# Patient Record
Sex: MALE | Race: WHITE | URBAN - METROPOLITAN AREA
[De-identification: names, ages, dates, MRNs, and addresses within clinical notes are randomized per-mention and may not be internally consistent; named-entity substitution may affect disease eponyms.]

---
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
## 2025-05-06 ENCOUNTER — OFFICE VISIT (OUTPATIENT)
Dept: URGENT CARE | Facility: CLINIC | Age: 20
End: 2025-05-06
Payer: COMMERCIAL

## 2025-05-06 VITALS
HEART RATE: 94 BPM | OXYGEN SATURATION: 100 % | RESPIRATION RATE: 18 BRPM | HEIGHT: 65 IN | WEIGHT: 160 LBS | DIASTOLIC BLOOD PRESSURE: 72 MMHG | TEMPERATURE: 99 F | BODY MASS INDEX: 26.66 KG/M2 | SYSTOLIC BLOOD PRESSURE: 121 MMHG

## 2025-05-06 DIAGNOSIS — L02.91 CUTANEOUS ABSCESS, UNSPECIFIED SITE: Primary | ICD-10-CM

## 2025-05-06 PROCEDURE — 99499 UNLISTED E&M SERVICE: CPT | Mod: ,,, | Performed by: CLINIC/CENTER

## 2025-05-06 PROCEDURE — 10060 I&D ABSCESS SIMPLE/SINGLE: CPT | Mod: ,,, | Performed by: CLINIC/CENTER

## 2025-05-06 RX ORDER — SULFAMETHOXAZOLE AND TRIMETHOPRIM 800; 160 MG/1; MG/1
1 TABLET ORAL 2 TIMES DAILY
Qty: 14 TABLET | Refills: 0 | Status: SHIPPED | OUTPATIENT
Start: 2025-05-06 | End: 2025-05-13

## 2025-05-06 NOTE — PROCEDURES
Incision & Drainage    Date/Time: 5/6/2025 12:30 PM    Performed by: Joseph Díaz FNP  Authorized by: Joseph Díaz FNP    Consent Done?:  Yes (Verbal) and Yes (Written)    Type:  Abscess  Body area:  Trunk (Left axilla)  Anesthesia:  Local infiltration  Local anesthetic: Lidocaine 1% without epinephrine  Scalpel size:  11  Incision type:  Single straight  Complexity:  Simple  Drainage:  Pus and bloody  Wound treatment:  Incision and wound packed  Packing material:  1/4 in iodoform gauze  Patient tolerance:  Patient tolerated the procedure well with no immediate complications

## 2025-05-06 NOTE — PATIENT INSTRUCTIONS
Wound Care: Twice daily wound care as discussed.   Pain: Take OTC Tylenol or Ibuprofen per package instructions as needed for pain.  Loosen the bandage if needed.   Follow up with this clinic Friday to get the wound re-evaluated.    Removed the packing in 48 hours  Present to the Emergency Department for any significant change or worsening symptoms including worsening redness, swelling, purulent discharge, fever, body aches, or chills.

## 2025-05-06 NOTE — PROGRESS NOTES
"Subjective:      Patient ID: Luis Agee is a 19 y.o. male.    Vitals:  height is 5' 4.96" (1.65 m) and weight is 72.6 kg (160 lb). His oral temperature is 98.9 °F (37.2 °C). His blood pressure is 121/72 and his pulse is 94. His respiration is 18 and oxygen saturation is 100%.     Chief Complaint: Recurrent Skin Infections     Patient is a 19 y.o. male who presents to urgent care with complaints of "boil" in left axilla, drainage, pain x 3 days. Alleviating factors include triple abx cream with mild amount of relief. Patient denies fever.        Skin:  Positive for lesion.      Objective:     Physical Exam   Constitutional: He is oriented to person, place, and time. He appears well-developed. He is cooperative.   HENT:   Head: Normocephalic and atraumatic.   Ears:   Right Ear: Hearing, tympanic membrane, external ear and ear canal normal.   Left Ear: Hearing, tympanic membrane, external ear and ear canal normal.   Nose: Nose normal. No mucosal edema or nasal deformity. No epistaxis. Right sinus exhibits no maxillary sinus tenderness and no frontal sinus tenderness. Left sinus exhibits no maxillary sinus tenderness and no frontal sinus tenderness.   Mouth/Throat: Uvula is midline, oropharynx is clear and moist and mucous membranes are normal. No trismus in the jaw. Normal dentition. No uvula swelling.   Eyes: Conjunctivae and lids are normal.   Neck: Trachea normal and phonation normal. Neck supple.   Cardiovascular: Normal rate, regular rhythm, normal heart sounds and normal pulses.   Pulmonary/Chest: Effort normal and breath sounds normal.   Abdominal: Normal appearance and bowel sounds are normal. Soft.   Musculoskeletal: Normal range of motion.         General: Normal range of motion.   Neurological: He is alert and oriented to person, place, and time. He exhibits normal muscle tone.   Skin: Skin is warm, dry and intact.         Comments: There is a 3 cm x 2 cm abscess noted on the patient's left axilla.  " "This wound is already draining.  There is obvious fluctuance and purulent drainage.  There is another smaller abscess that is proximally 1 cm x 1 cm just proximal to the larger abscess.  This 1 is fluctuant as well.  There is no surrounding cellulitis   Psychiatric: His speech is normal and behavior is normal. Judgment and thought content normal.   Nursing note and vitals reviewed.         Previous History      Review of patient's allergies indicates:  No Known Allergies    Past Medical History:   Diagnosis Date    Known health problems: none      Current Outpatient Medications   Medication Instructions    sulfamethoxazole-trimethoprim 800-160mg (BACTRIM DS) 800-160 mg Tab 1 tablet, Oral, 2 times daily     Past Surgical History:   Procedure Laterality Date    NO PAST SURGERIES       No family history on file.    Social History[1]     Physical Exam      Vital Signs Reviewed   /72   Pulse 94   Temp 98.9 °F (37.2 °C) (Oral)   Resp 18   Ht 5' 4.96" (1.65 m)   Wt 72.6 kg (160 lb)   SpO2 100%   BMI 26.66 kg/m²        Procedures    Procedures  Incision & Drainage     Date/Time: 5/6/2025 12:30 PM     Performed by: Joseph Díaz FNP  Authorized by: Joseph Díaz FNP    Consent Done?:  Yes (Verbal) and Yes (Written)     Type:  Abscess  Body area:  Trunk (Left axilla)  Anesthesia:  Local infiltration  Local anesthetic: Lidocaine 1% without epinephrine  Scalpel size:  11  Incision type:  Single straight  Complexity:  Simple  Drainage:  Pus and bloody  Wound treatment:  Incision and wound packed  Packing material:  1/4 in iodoform gauze  Patient tolerance:  Patient tolerated the procedure well with no immediate complications      Labs   No results found for this or any previous visit.   Assessment:     1. Cutaneous abscess, unspecified site        Plan:   Wound Care: Twice daily wound care as discussed.   Pain: Take OTC Tylenol or Ibuprofen per package instructions as needed for pain.  Loosen the bandage if " needed.   Follow up with this clinic Friday to get the wound re-evaluated.    Removed the packing in 48 hours  Present to the Emergency Department for any significant change or worsening symptoms including worsening redness, swelling, purulent discharge, fever, body aches, or chills.     Cutaneous abscess, unspecified site  -     Incision & Drainage  -     sulfamethoxazole-trimethoprim 800-160mg (BACTRIM DS) 800-160 mg Tab; Take 1 tablet by mouth 2 (two) times daily. for 7 days  Dispense: 14 tablet; Refill: 0                         [1]   Social History  Tobacco Use    Smoking status: Every Day     Types: Cigarettes    Smokeless tobacco: Never   Substance Use Topics    Alcohol use: Not Currently    Drug use: Yes     Types: Marijuana